# Patient Record
Sex: FEMALE | Race: WHITE | NOT HISPANIC OR LATINO
[De-identification: names, ages, dates, MRNs, and addresses within clinical notes are randomized per-mention and may not be internally consistent; named-entity substitution may affect disease eponyms.]

---

## 2022-06-02 ENCOUNTER — APPOINTMENT (OUTPATIENT)
Dept: ORTHOPEDIC SURGERY | Facility: CLINIC | Age: 82
End: 2022-06-02
Payer: MEDICARE

## 2022-06-02 VITALS — WEIGHT: 175 LBS | BODY MASS INDEX: 32.2 KG/M2 | HEIGHT: 62 IN

## 2022-06-02 PROBLEM — Z00.00 ENCOUNTER FOR PREVENTIVE HEALTH EXAMINATION: Status: ACTIVE | Noted: 2022-06-02

## 2022-06-02 PROCEDURE — 20610 DRAIN/INJ JOINT/BURSA W/O US: CPT

## 2022-06-02 PROCEDURE — 99213 OFFICE O/P EST LOW 20 MIN: CPT | Mod: 25

## 2022-06-02 PROCEDURE — J3490M: CUSTOM

## 2022-06-02 NOTE — HISTORY OF PRESENT ILLNESS
[0] : 0 [Sleep] : sleep [de-identified] : 6-2-22- Known left knee oa did well with orthovisc and therapy in november. Over the last 2 weeks medially based pain has returned and [place] [] : no [FreeTextEntry1] : left knee  [FreeTextEntry5] : She states that she felt great after her gel injs but she had a flare up on 5/27/22 and feels that the pain has been getting worse since. She couldn’t walk and had to use a wheelchair because the pain was so bad.

## 2022-06-02 NOTE — PHYSICAL EXAM
[Left] : left knee [5___] : hamstring 5[unfilled]/5 [Equivocal] : equivocal Rufus [] : negative Lachmann [TWNoteComboBox7] : flexion 120 degrees [de-identified] : extension 0 degrees

## 2022-06-02 NOTE — PROCEDURE
[Large Joint Injection] : Large joint injection [Left] : of the left [Knee] : knee [Pain] : pain [Inflammation] : inflammation [X-ray evidence of Osteoarthritis on this or prior visit] : x-ray evidence of Osteoarthritis on this or prior visit [Alcohol] : alcohol [Betadine] : betadine [Ethyl Chloride sprayed topically] : ethyl chloride sprayed topically [Sterile technique used] : sterile technique used [___ cc    6mg] :  Betamethasone (Celestone) ~Vcc of 6mg [___ cc    1%] : Lidocaine ~Vcc of 1%  [] : Patient tolerated procedure well [Call if redness, pain or fever occur] : call if redness, pain or fever occur [Apply ice for 15min out of every hour for the next 12-24 hours as tolerated] : apply ice for 15 minutes out of every hour for the next 12-24 hours as tolerated [Patient was advised to rest the joint(s) for ____ days] : patient was advised to rest the joint(s) for [unfilled] days [Previous OTC use and PT nontherapeutic] : patient has tried OTC's including aspirin, Ibuprofen, Aleve, etc or prescription NSAIDS, and/or exercises at home and/or physical therapy without satisfactory response [Patient had decreased mobility in the joint] : patient had decreased mobility in the joint [Risks, benefits, alternatives discussed / Verbal consent obtained] : the risks benefits, and alternatives have been discussed, and verbal consent was obtained

## 2022-06-02 NOTE — ASSESSMENT
[FreeTextEntry1] : will administer csi today as she has a trip tomorrow f/u 2 weeks for start of orthovisc series

## 2022-06-21 ENCOUNTER — APPOINTMENT (OUTPATIENT)
Dept: ORTHOPEDIC SURGERY | Facility: CLINIC | Age: 82
End: 2022-06-21
Payer: MEDICARE

## 2022-06-21 VITALS — BODY MASS INDEX: 32.2 KG/M2 | HEIGHT: 62 IN | WEIGHT: 175 LBS

## 2022-06-21 PROCEDURE — 20610 DRAIN/INJ JOINT/BURSA W/O US: CPT

## 2022-06-21 PROCEDURE — 99213 OFFICE O/P EST LOW 20 MIN: CPT | Mod: 25

## 2022-06-21 NOTE — PHYSICAL EXAM
[Left] : left knee [5___] : hamstring 5[unfilled]/5 [Equivocal] : equivocal Rufus [] : negative Lachmann [TWNoteComboBox7] : flexion 120 degrees [de-identified] : extension 0 degrees

## 2022-06-21 NOTE — PROCEDURE
[Large Joint Injection] : Large joint injection [Left] : of the left [Knee] : knee [Pain] : pain [Inflammation] : inflammation [X-ray evidence of Osteoarthritis on this or prior visit] : x-ray evidence of Osteoarthritis on this or prior visit [Alcohol] : alcohol [Betadine] : betadine [Ethyl Chloride sprayed topically] : ethyl chloride sprayed topically [Sterile technique used] : sterile technique used [Orthovisc] : Orthovisc [#1] : series #1 [] : Patient tolerated procedure well [Call if redness, pain or fever occur] : call if redness, pain or fever occur [Apply ice for 15min out of every hour for the next 12-24 hours as tolerated] : apply ice for 15 minutes out of every hour for the next 12-24 hours as tolerated [Patient was advised to rest the joint(s) for ____ days] : patient was advised to rest the joint(s) for [unfilled] days [Previous OTC use and PT nontherapeutic] : patient has tried OTC's including aspirin, Ibuprofen, Aleve, etc or prescription NSAIDS, and/or exercises at home and/or physical therapy without satisfactory response [Patient had decreased mobility in the joint] : patient had decreased mobility in the joint [Risks, benefits, alternatives discussed / Verbal consent obtained] : the risks benefits, and alternatives have been discussed, and verbal consent was obtained

## 2022-06-21 NOTE — HISTORY OF PRESENT ILLNESS
[0] : 0 [Sleep] : sleep [de-identified] : 6-2-22- Known left knee oa did well with orthovisc and therapy in november. Over the last 2 weeks medially based pain has returned and [place] [] : no [FreeTextEntry1] : left knee  [FreeTextEntry5] : She states that she felt great after her gel injs but she had a flare up on 5/27/22 and feels that the pain has been getting worse since. She couldn’t walk and had to use a wheelchair because the pain was so bad.

## 2022-06-23 ENCOUNTER — RESULT REVIEW (OUTPATIENT)
Age: 82
End: 2022-06-23

## 2022-06-28 ENCOUNTER — APPOINTMENT (OUTPATIENT)
Dept: ORTHOPEDIC SURGERY | Facility: CLINIC | Age: 82
End: 2022-06-28

## 2022-06-28 PROCEDURE — 99213 OFFICE O/P EST LOW 20 MIN: CPT | Mod: 25

## 2022-06-28 PROCEDURE — 20610 DRAIN/INJ JOINT/BURSA W/O US: CPT

## 2022-06-28 NOTE — HISTORY OF PRESENT ILLNESS
[0] : 0 [Sleep] : sleep [de-identified] : 6-2-22- Known left knee oa did well with orthovisc and therapy in november. Over the last 2 weeks medially based pain has returned and [place] [] : no [FreeTextEntry1] : left knee  [FreeTextEntry5] : She states that she felt great after her gel injs but she had a flare up on 5/27/22 and feels that the pain has been getting worse since. She couldn’t walk and had to use a wheelchair because the pain was so bad.

## 2022-06-28 NOTE — PROCEDURE
[Large Joint Injection] : Large joint injection [Left] : of the left [Knee] : knee [Pain] : pain [Inflammation] : inflammation [X-ray evidence of Osteoarthritis on this or prior visit] : x-ray evidence of Osteoarthritis on this or prior visit [Alcohol] : alcohol [Betadine] : betadine [Ethyl Chloride sprayed topically] : ethyl chloride sprayed topically [Sterile technique used] : sterile technique used [Orthovisc] : Orthovisc [#2] : series #2 [] : Patient tolerated procedure well [Call if redness, pain or fever occur] : call if redness, pain or fever occur [Apply ice for 15min out of every hour for the next 12-24 hours as tolerated] : apply ice for 15 minutes out of every hour for the next 12-24 hours as tolerated [Patient was advised to rest the joint(s) for ____ days] : patient was advised to rest the joint(s) for [unfilled] days [Previous OTC use and PT nontherapeutic] : patient has tried OTC's including aspirin, Ibuprofen, Aleve, etc or prescription NSAIDS, and/or exercises at home and/or physical therapy without satisfactory response [Patient had decreased mobility in the joint] : patient had decreased mobility in the joint [Risks, benefits, alternatives discussed / Verbal consent obtained] : the risks benefits, and alternatives have been discussed, and verbal consent was obtained

## 2022-06-28 NOTE — PHYSICAL EXAM
[Left] : left knee [5___] : hamstring 5[unfilled]/5 [Equivocal] : equivocal Rufus [] : negative Lachmann [TWNoteComboBox7] : flexion 120 degrees [de-identified] : extension 0 degrees

## 2022-07-05 ENCOUNTER — APPOINTMENT (OUTPATIENT)
Dept: ORTHOPEDIC SURGERY | Facility: CLINIC | Age: 82
End: 2022-07-05

## 2022-07-05 PROCEDURE — 20610 DRAIN/INJ JOINT/BURSA W/O US: CPT | Mod: EJ

## 2022-07-05 NOTE — HISTORY OF PRESENT ILLNESS
[0] : 0 [Sleep] : sleep [de-identified] : 7-5-22- for continued orthovisc to the left knee #3\par \par 6-2-22- Known left knee oa did well with orthovisc and therapy in november. Over the last 2 weeks medially based pain has returned and [place] [] : no [FreeTextEntry1] : left knee  [FreeTextEntry5] : She states that she felt great after her gel injs but she had a flare up on 5/27/22 and feels that the pain has been getting worse since. She couldn’t walk and had to use a wheelchair because the pain was so bad.

## 2022-07-05 NOTE — PROCEDURE
[Large Joint Injection] : Large joint injection [Left] : of the left [Knee] : knee [Pain] : pain [Inflammation] : inflammation [X-ray evidence of Osteoarthritis on this or prior visit] : x-ray evidence of Osteoarthritis on this or prior visit [Alcohol] : alcohol [Betadine] : betadine [Ethyl Chloride sprayed topically] : ethyl chloride sprayed topically [Sterile technique used] : sterile technique used [Orthovisc] : Orthovisc [#3] : series #3 [] : Patient tolerated procedure well [Call if redness, pain or fever occur] : call if redness, pain or fever occur [Apply ice for 15min out of every hour for the next 12-24 hours as tolerated] : apply ice for 15 minutes out of every hour for the next 12-24 hours as tolerated [Patient was advised to rest the joint(s) for ____ days] : patient was advised to rest the joint(s) for [unfilled] days [Previous OTC use and PT nontherapeutic] : patient has tried OTC's including aspirin, Ibuprofen, Aleve, etc or prescription NSAIDS, and/or exercises at home and/or physical therapy without satisfactory response [Patient had decreased mobility in the joint] : patient had decreased mobility in the joint [Risks, benefits, alternatives discussed / Verbal consent obtained] : the risks benefits, and alternatives have been discussed, and verbal consent was obtained

## 2022-07-12 ENCOUNTER — APPOINTMENT (OUTPATIENT)
Dept: ORTHOPEDIC SURGERY | Facility: CLINIC | Age: 82
End: 2022-07-12

## 2022-07-12 VITALS — HEIGHT: 62 IN | WEIGHT: 175 LBS | BODY MASS INDEX: 32.2 KG/M2

## 2022-07-12 PROCEDURE — 99213 OFFICE O/P EST LOW 20 MIN: CPT | Mod: 25

## 2022-07-12 PROCEDURE — 20610 DRAIN/INJ JOINT/BURSA W/O US: CPT

## 2022-07-12 NOTE — HISTORY OF PRESENT ILLNESS
[4] : 4 [Orthovisc] : Orthovisc [0] : 0 [Sleep] : sleep [de-identified] : 7-5-22- for continued orthovisc to the left knee #3\par \par 6-2-22- Known left knee oa did well with orthovisc and therapy in november. Over the last 2 weeks medially based pain has returned and [place] [] : no [FreeTextEntry1] : left knee  [FreeTextEntry5] : She states that she felt great after her gel injs but she had a flare up on 5/27/22 and feels that the pain has been getting worse since. She couldn’t walk and had to use a wheelchair because the pain was so bad. [de-identified] : 7/5/22

## 2022-07-12 NOTE — PHYSICAL EXAM
[Left] : left knee [5___] : hamstring 5[unfilled]/5 [Equivocal] : equivocal Rufus [] : negative Lachmann [TWNoteComboBox7] : flexion 120 degrees [de-identified] : extension 0 degrees

## 2022-07-12 NOTE — PROCEDURE
[Large Joint Injection] : Large joint injection [Left] : of the left [Knee] : knee [Pain] : pain [Inflammation] : inflammation [X-ray evidence of Osteoarthritis on this or prior visit] : x-ray evidence of Osteoarthritis on this or prior visit [Alcohol] : alcohol [Betadine] : betadine [Ethyl Chloride sprayed topically] : ethyl chloride sprayed topically [Sterile technique used] : sterile technique used [Orthovisc] : Orthovisc [] : Patient tolerated procedure well [Call if redness, pain or fever occur] : call if redness, pain or fever occur [Apply ice for 15min out of every hour for the next 12-24 hours as tolerated] : apply ice for 15 minutes out of every hour for the next 12-24 hours as tolerated [Patient was advised to rest the joint(s) for ____ days] : patient was advised to rest the joint(s) for [unfilled] days [Previous OTC use and PT nontherapeutic] : patient has tried OTC's including aspirin, Ibuprofen, Aleve, etc or prescription NSAIDS, and/or exercises at home and/or physical therapy without satisfactory response [Patient had decreased mobility in the joint] : patient had decreased mobility in the joint [Risks, benefits, alternatives discussed / Verbal consent obtained] : the risks benefits, and alternatives have been discussed, and verbal consent was obtained [#4] : series #4

## 2022-12-30 NOTE — PHYSICAL EXAM
[x] [facility]) [facility] &  Therapy  [address]    [phone]  F: [phone]   [] [id number]  [facility] [address]   [address]  P: [phone]  F: [phone]  [] [address]  [address]  P: [phone]  F: [phone] [] [address]  P: [phone]  F: [phone]  [] [address] [facility]   [address]   [place]: [phone]  F: [phone]   [] [place] [id number]  [place]: [phone]   F: [phone]     Physical Therapy Cancel/No Show note    Date: 2022  Patient: Radha Miller  : 1961  MRN: [id number]    Cancels/No Shows to date:     For today's appointment patient:    [x]  Cancelled    [] Rescheduled appointment    [] No-show     Reason given by patient:    [x]  Patient ill    []  Conflicting appointment    [] No transportation      [] Conflict with work    [] No reason given    [] Weather related    [] [id number]    [] Other:      Comments:      [x] Next appointment was previously confirmed    Electronically signed by: Tanesha Murguia PTA [Left] : left knee [5___] : hamstring 5[unfilled]/5 [Equivocal] : equivocal Rufus [] : negative Lachmann [TWNoteComboBox7] : flexion 120 degrees [de-identified] : extension 0 degrees

## 2023-01-26 ENCOUNTER — APPOINTMENT (OUTPATIENT)
Dept: ORTHOPEDIC SURGERY | Facility: CLINIC | Age: 83
End: 2023-01-26
Payer: MEDICARE

## 2023-01-26 VITALS — HEIGHT: 62 IN | WEIGHT: 175 LBS | BODY MASS INDEX: 32.2 KG/M2

## 2023-01-26 PROCEDURE — 72040 X-RAY EXAM NECK SPINE 2-3 VW: CPT

## 2023-01-26 PROCEDURE — 72100 X-RAY EXAM L-S SPINE 2/3 VWS: CPT

## 2023-01-26 PROCEDURE — 72170 X-RAY EXAM OF PELVIS: CPT

## 2023-01-26 PROCEDURE — 99214 OFFICE O/P EST MOD 30 MIN: CPT

## 2023-01-26 RX ORDER — METHYLPREDNISOLONE 4 MG/1
4 TABLET ORAL
Qty: 1 | Refills: 0 | Status: ACTIVE | COMMUNITY
Start: 2023-01-26 | End: 1900-01-01

## 2023-01-26 NOTE — PHYSICAL EXAM
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 No significant abnormalities are seen] : There are no fractures, subluxations or dislocations. No significant abnormalities are seen [] : negative Lachmann [TWNoteComboBox7] : flexion 120 degrees [de-identified] : extension 0 degrees

## 2023-01-26 NOTE — HISTORY OF PRESENT ILLNESS
[0] : 0 [Sleep] : sleep [4] : 4 [Orthovisc] : Orthovisc [de-identified] : 7-5-22- for continued orthovisc to the left knee #3\par \par 6-2-22- Known left knee oa did well with orthovisc and therapy in november. Over the last 2 weeks medially based pain has returned and [place] [] : no [FreeTextEntry1] : left knee  [FreeTextEntry5] : She states that she felt great after her gel injs but she had a flare up on 5/27/22 and feels that the pain has been getting worse since. She couldn’t walk and had to use a wheelchair because the pain was so bad. [de-identified] : 7/5/22

## 2023-01-26 NOTE — REASON FOR VISIT
[FreeTextEntry2] : 1/26/23- Left knee is feeling better, having low back pain issues, and pain down right arm to hand tre at night. No neck or back injury

## 2023-03-30 ENCOUNTER — APPOINTMENT (OUTPATIENT)
Dept: ORTHOPEDIC SURGERY | Facility: CLINIC | Age: 83
End: 2023-03-30
Payer: MEDICARE

## 2023-03-30 VITALS — HEIGHT: 62 IN | WEIGHT: 175 LBS | BODY MASS INDEX: 32.2 KG/M2

## 2023-03-30 DIAGNOSIS — M50.320 OTHER CERVICAL DISC DEGENERATION, MID-CERVICAL REGION, UNSPECIFIED LEVEL: ICD-10-CM

## 2023-03-30 DIAGNOSIS — M17.12 UNILATERAL PRIMARY OSTEOARTHRITIS, LEFT KNEE: ICD-10-CM

## 2023-03-30 PROCEDURE — 99213 OFFICE O/P EST LOW 20 MIN: CPT

## 2023-03-30 NOTE — REASON FOR VISIT
[FreeTextEntry2] : 3/30/23- Still getting right arm symptoms at night, but pain/numbness subsides during the day. PT of help\par 1/26/23- Left knee is feeling better, having low back pain issues, and pain down right arm to hand tre at night. No neck or back injury

## 2023-03-30 NOTE — HISTORY OF PRESENT ILLNESS
[0] : 0 [Sleep] : sleep [4] : 4 [Orthovisc] : Orthovisc [de-identified] : 7-5-22- for continued orthovisc to the left knee #3\par \par 6-2-22- Known left knee oa did well with orthovisc and therapy in november. Over the last 2 weeks medially based pain has returned and [place] [] : no [FreeTextEntry1] : left knee  [FreeTextEntry5] : She states that she felt great after her gel injs but she had a flare up on 5/27/22 and feels that the pain has been getting worse since. She couldn’t walk and had to use a wheelchair because the pain was so bad. [de-identified] : 7/5/22

## 2023-03-30 NOTE — PHYSICAL EXAM
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 No significant abnormalities are seen] : There are no fractures, subluxations or dislocations. No significant abnormalities are seen [Left] : left knee [5___] : hamstring 5[unfilled]/5 [Negative] : negative Justyna's [] : patient ambulates without assistive device [TWNoteComboBox7] : flexion 120 degrees [de-identified] : extension 0 degrees

## 2023-04-25 ENCOUNTER — FORM ENCOUNTER (OUTPATIENT)
Age: 83
End: 2023-04-25

## 2023-04-25 ENCOUNTER — APPOINTMENT (OUTPATIENT)
Dept: ORTHOPEDIC SURGERY | Facility: CLINIC | Age: 83
End: 2023-04-25
Payer: MEDICARE

## 2023-04-25 VITALS — HEIGHT: 62 IN | BODY MASS INDEX: 32.2 KG/M2 | WEIGHT: 175 LBS

## 2023-04-25 DIAGNOSIS — M51.36 OTHER INTERVERTEBRAL DISC DEGENERATION, LUMBAR REGION: ICD-10-CM

## 2023-04-25 DIAGNOSIS — M41.80 OTHER FORMS OF SCOLIOSIS, SITE UNSPECIFIED: ICD-10-CM

## 2023-04-25 PROCEDURE — 99213 OFFICE O/P EST LOW 20 MIN: CPT

## 2023-04-25 NOTE — HISTORY OF PRESENT ILLNESS
[0] : 0 [Sleep] : sleep [4] : 4 [Orthovisc] : Orthovisc [de-identified] : 7-5-22- for continued orthovisc to the left knee #3\par \par 6-2-22- Known left knee oa did well with orthovisc and therapy in november. Over the last 2 weeks medially based pain has returned and [place] [] : no [FreeTextEntry1] : left knee  [FreeTextEntry5] : She states that she felt great after her gel injs but she had a flare up on 5/27/22 and feels that the pain has been getting worse since. She couldn’t walk and had to use a wheelchair because the pain was so bad. [de-identified] : 7/5/22

## 2023-04-25 NOTE — DISCUSSION/SUMMARY
[de-identified] : Needs MRI lumbar to assess HNP/stenosis, then referred to Pain Management for LESIs

## 2023-04-25 NOTE — PHYSICAL EXAM
[Left] : left knee [5___] : hamstring 5[unfilled]/5 [Negative] : negative Justyna's [] : patient ambulates without assistive device [TWNoteComboBox7] : flexion 120 degrees [de-identified] : extension 0 degrees

## 2023-04-25 NOTE — REASON FOR VISIT
[FreeTextEntry2] : 4/25/23- Having worsening left sciatica symptoms from buttock down leg\par 3/30/23- Still getting right arm symptoms at night, but pain/numbness subsides during the day. PT of help\par 1/26/23- Left knee is feeling better, having low back pain issues, and pain down right arm to hand tre at night. No neck or back injury

## 2023-04-26 ENCOUNTER — APPOINTMENT (OUTPATIENT)
Dept: MRI IMAGING | Facility: CLINIC | Age: 83
End: 2023-04-26
Payer: MEDICARE

## 2023-04-26 PROCEDURE — 72148 MRI LUMBAR SPINE W/O DYE: CPT

## 2023-05-09 ENCOUNTER — APPOINTMENT (OUTPATIENT)
Dept: PAIN MANAGEMENT | Facility: CLINIC | Age: 83
End: 2023-05-09
Payer: MEDICARE

## 2023-05-09 VITALS — HEIGHT: 62 IN | BODY MASS INDEX: 32.2 KG/M2 | WEIGHT: 175 LBS

## 2023-05-09 DIAGNOSIS — Z78.9 OTHER SPECIFIED HEALTH STATUS: ICD-10-CM

## 2023-05-09 DIAGNOSIS — I47.1 SUPRAVENTRICULAR TACHYCARDIA: ICD-10-CM

## 2023-05-09 PROCEDURE — 99204 OFFICE O/P NEW MOD 45 MIN: CPT

## 2023-05-09 NOTE — PHYSICAL EXAM
[NL (90)] : forward flexion 90 degrees [Extension] : extension [4___] : left hip flexion 4[unfilled]/5 [3___] : right hip flexion 3[unfilled]/5 [] : positive Morillo maneuver facet loading [de-identified] : extension 10 degrees

## 2023-05-09 NOTE — ASSESSMENT
[FreeTextEntry1] : After discussing various treatment options with the patient including but not limited to oral medications, physical therapy, exercise, modalities as well as interventional spinal injections, we have decided with the following plan:\par \par 1) Intervention Injection Therapy:\par I personally reviewed the MRI/CT scan images and agree with the radiologist's report. The radiological findings were discussed with the patient.\par The risks, benefits, contents and alternatives to injection were explained in full to the patient. Risks outlined include but are not limited to infection,sepsis, bleeding, post-dural puncture headache, nerve damage, temporary increase in pain, syncopal episode, failure to resolve symptoms, allergic reaction, symptom recurrence, and elevation of blood sugar in diabetics. Cortisone may cause immunosuppression. Patient understands the risks. All questions were answered. After discussion of options, patient requested an injection. Information regarding the injection was given to the patient. Which medications to stop prior to the injection was explained to the patient as well.\par \par Follow up in 1-2 weeks post injection for re-evaluation. \par Continue Home exercises, stretching, activity modification, physical therapy, and conservative care.\par \par Patient is presenting with acute/sub-acute radicular pain with impairment in ADLs and functionality.  The pain has not responded sufficiently to  conservative care including nsaid therapy and/or physical therapy.  There is no bleeding tendency, unstable medical condition, or systemic infection. The purpose of the spinal injections is to facilitate active therapy by providing short term relief through reduction of pain and inflammation. \par \par Injections, by themselves, are not likely to provide long-term relief. Rather, active rehabilitation with modified work achieves long-term relief by increasing active ROM, strength and stability. \par \par LESI L4/5 \par \par 2) I would recommend acupuncture as an adjuvant therapy for JOSE . He has completed other conservative therapies including med management and physical therapy. Goals would be to improve pain, spasm, ROM and overall function. \par \par \par \par \par \par

## 2023-05-09 NOTE — HISTORY OF PRESENT ILLNESS
[Lower back] : lower back [4] : 4 [Radiating] : radiating [Constant] : constant [Household chores] : household chores [Meds] : meds [Physical therapy] : physical therapy [Sitting] : sitting [de-identified] : 05/09/2023 : Patient presents for initial evaluation. She c/o back pain and left leg pain for the last couple of months. Pain radiates to the ankle. +n/t in the feet. no weakness. Takes Advil PRN.  Currently in PT with some benefit. Also treating her neck pain. \par \par Subjective Weakness: No\par Numbness/Tingling: Yes\par Bladder/Bowel dysfunction: No\par Physical Therapy: Current\par \par \par Attempted modalities for current pain complaint:\par See above:\par Medications: No\par \par Injections: No \par \par Previous Spine Surgery: N/A\par \par Imaging:\par MRI Lumbar Spine (4/26/23) Impression: \par 1. Convex left curvature, diffuse loss of disc signal and height, Schmorl's nodes and Modic type 1 endplate \par change at anterior L1-L2. No fracture.\par 2. L5-S1: Bulge, facet hypertrophy, and left facet effusion with inferior left foraminal stenosis.\par 3. L4-L5: Minor retrolisthesis. Bulge, spondylotic ridge, facet arthrosis, ligamentum flavum hypertrophy, and \par facet effusion on the right. Foraminal stenosis on the left. Mild central stenosis.\par 4. L3-L4: Minor retrolisthesis. Broad bulge, facet arthrosis, ligamentum flavum hypertrophy with foraminal \par stenosis. Mild central stenosis.\par  5. L2-L3: Minor retrolisthesis. Broad bulge, spondylotic ridge, facet arthrosis right greater than left. Mild \par central stenosis.\par 6. L1-L2: Minor retrolisthesis. Broad bulge, facet arthrosis, ligamentum flavum hypertrophy with facet effusion \par right greater than left. Inferior right foraminal stenosis. Mild central stenosis.\par 7. T12-L1: Broad bulge and facet arthrosis.\par 8. Bilateral renal cysts. Dilated ureters. Ultrasound is suggested for further evaluation. [] : no [FreeTextEntry7] : left side  [FreeTextEntry9] : XANAX  [de-identified] : getting up from sitting  [de-identified] : MRI L SPINE 04/23 [facility]

## 2023-05-25 ENCOUNTER — APPOINTMENT (OUTPATIENT)
Dept: PAIN MANAGEMENT | Facility: CLINIC | Age: 83
End: 2023-05-25
Payer: MEDICARE

## 2023-05-25 PROCEDURE — 62323 NJX INTERLAMINAR LMBR/SAC: CPT

## 2023-05-25 NOTE — PROCEDURE
[FreeTextEntry3] : Date of Service: 05/25/2023 \par \par Account: [id number]\par \par Patient: JOSE TATE \par \par YOB: 1940\par \par Age: 83 year\par \par Surgeon: Flor Rodrigues M.D.\par \par Pre-Operative Diagnosis:  Lumbosacral Radiculitis (M54.17)\par \par Post Operative Diagnosis: Lumbosacral Radiculitis (M54.17)\par \par Procedure: Interlaminar lumbar epidural steroid injection (L4-5) under fluoroscopic guidance\par \par Anesthesia:           MAC\par \par \par This procedure was carried out using fluoroscopic guidance.  The risks and benefits of the procedure were discussed extensively with the patient.  The consent of the patient was obtained and the following procedure was performed.\par \par The patient was placed in the prone position.  The lumbar area was prepped and draped in a sterile fashion.  Under AP view with slight cephalad-caudad angulation, the L4-5 interspace was identified and marked.  Using sterile technique the superficial skin was anesthetized with 1% Lidocaine without epinephrine.  A 20 gauge Tuohoy needle was advanced into the epidural space under fluoroscopy using [id number] technique and using loss of resistance at the L4-5 level.  After negative aspiration for heme or CSF, an epidurogram was obtained using 3 cc Omnipaque contrast confirming epidural placement of the needle. \par \par Lumbar epidurogram showed no intrathecal or intravascular spread and showed adequate bilateral epidural spread from L1 to S1 levels.\par \par After this, 5 cc of preservative free normal saline and 80 mg of Kenalog were injected into the epidural space.\par \par The needle was subsequently removed.  Anesthesia personnel were present throughout the procedure.\par \par The patient tolerated the procedure well and was instructed to contact me immediately if there were any problems.\par \par Flor Rodrigues M.D.\par

## 2023-06-14 ENCOUNTER — APPOINTMENT (OUTPATIENT)
Dept: PAIN MANAGEMENT | Facility: CLINIC | Age: 83
End: 2023-06-14

## 2023-06-15 ENCOUNTER — APPOINTMENT (OUTPATIENT)
Dept: PAIN MANAGEMENT | Facility: CLINIC | Age: 83
End: 2023-06-15
Payer: MEDICARE

## 2023-06-15 VITALS — HEIGHT: 62 IN | WEIGHT: 175 LBS | BODY MASS INDEX: 32.2 KG/M2

## 2023-06-15 DIAGNOSIS — M54.50 LOW BACK PAIN, UNSPECIFIED: ICD-10-CM

## 2023-06-15 PROCEDURE — 99214 OFFICE O/P EST MOD 30 MIN: CPT

## 2023-06-15 NOTE — PHYSICAL EXAM
[NL (90)] : forward flexion 90 degrees [Extension] : extension [4___] : left hip flexion 4[unfilled]/5 [3___] : right hip flexion 3[unfilled]/5 [] : no lumbar paraspinal tenderness [de-identified] : extension 10 degrees

## 2023-06-15 NOTE — ASSESSMENT
[FreeTextEntry1] : After discussing various treatment options with the patient including but not limited to oral medications, physical therapy, exercise, modalities as well as interventional spinal injections, we have decided with the following plan:\par \par 1) Intervention Injection Therapy:\par I personally reviewed the MRI/CT scan images and agree with the radiologist's report. The radiological findings were discussed with the patient.\par The risks, benefits, contents and alternatives to injection were explained in full to the patient. Risks outlined include but are not limited to infection,sepsis, bleeding, post-dural puncture headache, nerve damage, temporary increase in pain, syncopal episode, failure to resolve symptoms, allergic reaction, symptom recurrence, and elevation of blood sugar in diabetics. Cortisone may cause immunosuppression. Patient understands the risks. All questions were answered. After discussion of options, patient requested an injection. Information regarding the injection was given to the patient. Which medications to stop prior to the injection was explained to the patient as well.\par \par Follow up in 1-2 weeks post injection for re-evaluation. \par Continue Home exercises, stretching, activity modification, physical therapy, and conservative care.\par \par Patient is presenting with acute/sub-acute radicular pain with impairment in ADLs and functionality.  The pain has not responded sufficiently to  conservative care including nsaid therapy and/or physical therapy.  There is no bleeding tendency, unstable medical condition, or systemic infection. The purpose of the spinal injections is to facilitate active therapy by providing short term relief through reduction of pain and inflammation. \par \par Injections, by themselves, are not likely to provide long-term relief. Rather, active rehabilitation with modified work achieves long-term relief by increasing active ROM, strength and stability. \par \par TFESI L4/5 , 5/S1\par \par 2) I would recommend acupuncture as an adjuvant therapy for JOSE . He has completed other conservative therapies including med management and physical therapy. Goals would be to improve pain, spasm, ROM and overall function. \par \par \par \par \par \par

## 2023-06-15 NOTE — HISTORY OF PRESENT ILLNESS
[Lower back] : lower back [Radiating] : radiating [Household chores] : household chores [Meds] : meds [Physical therapy] : physical therapy [Sitting] : sitting [Neck] : neck [4] : 4 [Dull/Aching] : dull/aching [Intermittent] : intermittent [] : no [FreeTextEntry7] : left side  [FreeTextEntry9] : XANAX  [de-identified] : getting up from sitting  [de-identified] : MRI L SPINE 04/23 [facility] P[facility]  [de-identified] : 06/15/2023: follow up today sfter DIONY L4-5 on 5/25.  Had 80% relief for about 2 weeks.  Will switch to Left L4/5, L5/S1 TFESI.  \par \par 05/09/2023 : Patient presents for initial evaluation. She c/o back pain and left leg pain for the last couple of months. Pain radiates to the ankle. +n/t in the feet. no weakness. Takes Advil PRN.  Currently in PT with some benefit. Also treating her neck pain. \par \par Subjective Weakness: No\par Numbness/Tingling: Yes\par Bladder/Bowel dysfunction: No\par Physical Therapy: Current\par \par \par Attempted modalities for current pain complaint:\par See above:\par Medications: No\par \par Injections: No \par DIONY L4/5 5/25/23\par Previous Spine Surgery: N/A\par \par Imaging:\par MRI Lumbar Spine (4/26/23) Impression: \par 1. Convex left curvature, diffuse loss of disc signal and height, Schmorl's nodes and Modic type 1 endplate \par change at anterior L1-L2. No fracture.\par 2. L5-S1: Bulge, facet hypertrophy, and left facet effusion with inferior left foraminal stenosis.\par 3. L4-L5: Minor retrolisthesis. Bulge, spondylotic ridge, facet arthrosis, ligamentum flavum hypertrophy, and \par facet effusion on the right. Foraminal stenosis on the left. Mild central stenosis.\par 4. L3-L4: Minor retrolisthesis. Broad bulge, facet arthrosis, ligamentum flavum hypertrophy with foraminal \par stenosis. Mild central stenosis.\par  5. L2-L3: Minor retrolisthesis. Broad bulge, spondylotic ridge, facet arthrosis right greater than left. Mild \par central stenosis.\par 6. L1-L2: Minor retrolisthesis. Broad bulge, facet arthrosis, ligamentum flavum hypertrophy with facet effusion \par right greater than left. Inferior right foraminal stenosis. Mild central stenosis.\par 7. T12-L1: Broad bulge and facet arthrosis.\par 8. Bilateral renal cysts. Dilated ureters. Ultrasound is suggested for further evaluation.

## 2023-07-13 ENCOUNTER — APPOINTMENT (OUTPATIENT)
Dept: PAIN MANAGEMENT | Facility: CLINIC | Age: 83
End: 2023-07-13

## 2023-08-10 ENCOUNTER — APPOINTMENT (OUTPATIENT)
Dept: PAIN MANAGEMENT | Facility: CLINIC | Age: 83
End: 2023-08-10
Payer: MEDICARE

## 2023-08-10 PROCEDURE — 64483 NJX AA&/STRD TFRM EPI L/S 1: CPT | Mod: LT

## 2023-08-10 PROCEDURE — 64484 NJX AA&/STRD TFRM EPI L/S EA: CPT | Mod: LT

## 2023-08-10 NOTE — PROCEDURE
[FreeTextEntry3] : Date of Service: 08/10/2023   Account: [id number]  Patient: JOSE TATE   YOB: 1940  Age: 83 year   Surgeon: Flor Rodrigues M.D.  Assistant: None.  Pre-Operative Diagnosis: Lumbosacral Radiculitis (M54.17)  Post Operative Diagnosis: Lumbosacral Radiculitis (M54.17)  Procedure: Left L4-5, L5-S1 transforaminal epidural steroid injection under fluoroscopic guidance.           This procedure was carried out using fluoroscopic guidance.  The risks and benefits of the procedure were discussed extensively with the patient.  The consent of the patient was obtained and the following procedure was performed. The patient was placed in the prone position on the fluoroscopic table and the lumbar area was prepped and draped in a sterile fashion.  The left L4-5 and L5-S1 neural foramen were identified on left oblique "nile dog" anatomical view at the 6 o' clock position using fluoroscopic guidance, and the area was marked. The overlying skin and subcutaneous structures were anesthetized using sterile technique with 1% Lidocaine.  A 22 gauge spinal needle was directed toward the inferior (6 o'clock) position of the pedicle, which formed the roof of the identified foramens.  Once in the epidural space, after negative aspiration for heme and CSF, 1cc of Omnipaque contrast was injected to confirm epidural location and assess filling defects and rule out intravascular needle placement.   The following contrast flow and epidurogram was observed: no intravascular or intrathecal flow pattern was noted.  No blood or CSF was aspirated. Omnipaque spread appeared to outline the left L4 and L5 nerve roots and spread medially into the epidural space.    After this, an injectate of 3 cc preservative free normal saline plus 40 mg of Kenalog was injected in the epidural space at each of the two levels.  The needle was subsequently removed.   The patient tolerated the procedure well.  There were no complications.  The patient was instructed to apply ice over the injection sites for twenty minutes every two hours for the next 24 to 48 hours.  The patient was also instructed to contact me immediately if there were any problems.  Flor Rodrigues M.D.

## 2023-08-24 ENCOUNTER — APPOINTMENT (OUTPATIENT)
Dept: PAIN MANAGEMENT | Facility: CLINIC | Age: 83
End: 2023-08-24
Payer: MEDICARE

## 2023-08-24 VITALS — BODY MASS INDEX: 32.2 KG/M2 | HEIGHT: 62 IN | WEIGHT: 175 LBS

## 2023-08-24 DIAGNOSIS — M47.816 SPONDYLOSIS W/OUT MYELOPATHY OR RADICULOPATHY, LUMBAR REGION: ICD-10-CM

## 2023-08-24 PROCEDURE — 99214 OFFICE O/P EST MOD 30 MIN: CPT

## 2023-08-24 NOTE — ADDENDUM
[FreeTextEntry1] : Patient with recent blood work with WBC 2.0 and Platelets 140. she has f/u with heme/onc. She will f/u after. injection cancelled.

## 2023-08-24 NOTE — PHYSICAL EXAM
[NL (90)] : forward flexion 90 degrees [Extension] : extension [4___] : left hip flexion 4[unfilled]/5 [3___] : right hip flexion 3[unfilled]/5 [] : motor exam is non-focal throughout both lower extremities [de-identified] : extension 10 degrees

## 2023-08-24 NOTE — HISTORY OF PRESENT ILLNESS
[Neck] : neck [Lower back] : lower back [4] : 4 [Dull/Aching] : dull/aching [Radiating] : radiating [Intermittent] : intermittent [Household chores] : household chores [Meds] : meds [Physical therapy] : physical therapy [Sitting] : sitting [FreeTextEntry7] : left side  [7] : 7 [6] : 6 [Injection therapy] : injection therapy [Lying in bed] : lying in bed [] : no [FreeTextEntry1] : Right side  [FreeTextEntry9] : XANAX  [de-identified] : getting up from sitting  [de-identified] : MRI L SPINE 04/23 [facility] P[facility]  [TWNoteComboBox1] : 90% [de-identified] : 08/24/2023: follow up today for left L4/5, L5/S1 TFESI on 8/10.  Had 70% -80% relief. Pain over the right lower back. No neck pain.     06/15/2023: follow up today after LESI L4-5 on 5/25.  Had 80% relief for about 2 weeks.  Will switch to Left L4/5, L5/S1 TFESI.    05/09/2023 : Patient presents for initial evaluation. She c/o back pain and left leg pain for the last couple of months. Pain radiates to the ankle. +n/t in the feet. no weakness. Takes Advil PRN.  Currently in PT with some benefit. Also treating her neck pain.   Subjective Weakness: No Numbness/Tingling: Yes Bladder/Bowel dysfunction: No Physical Therapy: January to June.    Attempted modalities for current pain complaint: See above: Medications: No  Injections: No  LESI L4/5 5/25/23 Left L4/5, L5/S1 TFESI 8/11/23 Previous Spine Surgery: N/A  Imaging: MRI Lumbar Spine (4/26/23) Impression:  1. Convex left curvature, diffuse loss of disc signal and height, Schmorl's nodes and Modic type 1 endplate  change at anterior L1-L2. No fracture. 2. L5-S1: Bulge, facet hypertrophy, and left facet effusion with inferior left foraminal stenosis. 3. L4-L5: Minor retrolisthesis. Bulge, spondylotic ridge, facet arthrosis, ligamentum flavum hypertrophy, and  facet effusion on the right. Foraminal stenosis on the left. Mild central stenosis. 4. L3-L4: Minor retrolisthesis. Broad bulge, facet arthrosis, ligamentum flavum hypertrophy with foraminal  stenosis. Mild central stenosis.  5. L2-L3: Minor retrolisthesis. Broad bulge, spondylotic ridge, facet arthrosis right greater than left. Mild  central stenosis. 6. L1-L2: Minor retrolisthesis. Broad bulge, facet arthrosis, ligamentum flavum hypertrophy with facet effusion  right greater than left. Inferior right foraminal stenosis. Mild central stenosis. 7. T12-L1: Broad bulge and facet arthrosis. 8. Bilateral renal cysts. Dilated ureters. Ultrasound is suggested for further evaluation.

## 2023-08-24 NOTE — ASSESSMENT
[FreeTextEntry1] : After discussing various treatment options with the patient including but not limited to oral medications, physical therapy, exercise, modalities as well as interventional spinal injections, we have decided with the following plan:  1) Intervention Injection Therapy: I personally reviewed the MRI/CT scan images and agree with the radiologist's report. The radiological findings were discussed with the patient. The risks, benefits, contents and alternatives to injection were explained in full to the patient. Risks outlined include but are not limited to infection,sepsis, bleeding, post-dural puncture headache, nerve damage, temporary increase in pain, syncopal episode, failure to resolve symptoms, allergic reaction, symptom recurrence, and elevation of blood sugar in diabetics. Cortisone may cause immunosuppression. Patient understands the risks. All questions were answered. After discussion of options, patient requested an injection. Information regarding the injection was given to the patient. Which medications to stop prior to the injection was explained to the patient as well.  Follow up in 1-2 weeks post injection for re-evaluation.  Continue Home exercises, stretching, activity modification, physical therapy, and conservative care.  Patient is presenting with acute/sub-acute radicular pain with impairment in ADLs and functionality.  The pain has not responded sufficiently to  conservative care including nsaid therapy and/or physical therapy.  There is no bleeding tendency, unstable medical condition, or systemic infection. The purpose of the spinal injections is to facilitate active therapy by providing short term relief through reduction of pain and inflammation.   Injections, by themselves, are not likely to provide long-term relief. Rather, active rehabilitation with modified work achieves long-term relief by increasing active ROM, strength and stability.   Patient presents with axial lumbar pain that has not responded to  3 months of conservative therapy including physical therapy or NSAID therapy.  The pain is interfering with activities of daily living and functionality.   There is no radicular pain.   The pain is exacerbated by facet loading.  Positive Kemps maneuver which is defined by pain reproduction with extension and rotation of the lumbar spine to the affected side.  The patient has not had a vertebral fusion at the levels of the proposed treatment.  There is no unexplained neurologic deficit.  There is no history of systemic infection, unstable medical condition, bleeding tendency, or local infection.  The injection is being performed to diagnose the facet joint as the source of the individual's pain.  right lumbar MBB will call (axial lower back pain)  2) I would recommend acupuncture as an adjuvant therapy for JOSE . He has completed other conservative therapies including med management and physical therapy. Goals would be to improve pain, spasm, ROM and overall function.   3) consider aqua therapy

## 2024-01-05 ENCOUNTER — APPOINTMENT (OUTPATIENT)
Dept: ORTHOPEDIC SURGERY | Facility: CLINIC | Age: 84
End: 2024-01-05
Payer: MEDICARE

## 2024-01-05 DIAGNOSIS — M54.12 RADICULOPATHY, CERVICAL REGION: ICD-10-CM

## 2024-01-05 PROCEDURE — 99213 OFFICE O/P EST LOW 20 MIN: CPT

## 2024-01-05 RX ORDER — METHYLPREDNISOLONE 4 MG/1
4 TABLET ORAL
Qty: 1 | Refills: 0 | Status: ACTIVE | COMMUNITY
Start: 2024-01-05 | End: 1900-01-01

## 2024-01-05 NOTE — HISTORY OF PRESENT ILLNESS
[0] : 0 [Sleep] : sleep [4] : 4 [Orthovisc] : Orthovisc [de-identified] : 4/25/23- Having worsening left sciatica symptoms from buttock down leg 3/30/23- Still getting right arm symptoms at night, but pain/numbness subsides during the day. PT of help 1/26/23- Left knee is feeling better, having low back pain issues, and pain down right arm to hand tre at night. No neck or back injury7- 5-22- for continued orthovisc to the left knee #3  6-2-22- Known left knee oa did well with orthovisc and therapy in november. Over the last 2 weeks medially based pain has returned and she [place] a trip [place] [] : no [FreeTextEntry1] : left knee  [FreeTextEntry5] : She states that she felt great after her gel injs but she had a flare up on 5/27/22 and feels that the pain has been getting worse since. She couldn't walk and had to use a wheelchair because the pain was so bad. [de-identified] : 7/5/22

## 2024-01-05 NOTE — REASON FOR VISIT
[FreeTextEntry2] : 01/05/2024 Getting pain and numbness from shoulders down to the hands when she lays down at night. Not getting much numbness during the day

## 2024-01-11 ENCOUNTER — APPOINTMENT (OUTPATIENT)
Dept: NEUROLOGY | Facility: CLINIC | Age: 84
End: 2024-01-11

## 2024-01-16 ENCOUNTER — APPOINTMENT (OUTPATIENT)
Dept: ORTHOPEDIC SURGERY | Facility: CLINIC | Age: 84
End: 2024-01-16

## 2024-01-23 ENCOUNTER — APPOINTMENT (OUTPATIENT)
Dept: NEUROLOGY | Facility: CLINIC | Age: 84
End: 2024-01-23
Payer: MEDICARE

## 2024-01-23 PROCEDURE — 95913 NRV CNDJ TEST 13/> STUDIES: CPT

## 2024-01-23 PROCEDURE — 95886 MUSC TEST DONE W/N TEST COMP: CPT

## 2024-01-25 ENCOUNTER — APPOINTMENT (OUTPATIENT)
Dept: ORTHOPEDIC SURGERY | Facility: CLINIC | Age: 84
End: 2024-01-25
Payer: MEDICARE

## 2024-01-25 VITALS — WEIGHT: 175 LBS | BODY MASS INDEX: 32.2 KG/M2 | HEIGHT: 62 IN

## 2024-01-25 DIAGNOSIS — G56.00 CARPAL TUNNEL SYNDROME, UNSPECIFIED UPPER LIMB: ICD-10-CM

## 2024-01-25 PROCEDURE — 99213 OFFICE O/P EST LOW 20 MIN: CPT

## 2024-01-26 ENCOUNTER — APPOINTMENT (OUTPATIENT)
Dept: ORTHOPEDIC SURGERY | Facility: CLINIC | Age: 84
End: 2024-01-26

## 2024-02-02 ENCOUNTER — APPOINTMENT (OUTPATIENT)
Dept: ORTHOPEDIC SURGERY | Facility: CLINIC | Age: 84
End: 2024-02-02
Payer: MEDICARE

## 2024-02-02 PROCEDURE — L3908: CPT | Mod: RT

## 2024-02-02 PROCEDURE — 99203 OFFICE O/P NEW LOW 30 MIN: CPT

## 2024-02-02 NOTE — HISTORY OF PRESENT ILLNESS
[0] : 0 [4] : 4 [Orthovisc] : Orthovisc [de-identified] : 83 year old female presenting with b/l hand pain, numbness and tingling. She had intermittent symptoms for years, but worsened about 2 months ago. Symptoms are worse at night. Symptoms are not constant. She has not yet night time splinted. She was seen by Dr. Cortez who ordered an EMG.   EMGs: Severe right, moderate left CTS. [] : no [FreeTextEntry1] : b/l hand [FreeTextEntry5] : b/l hand pain that was evaluated by dr. valladares [de-identified] : EMG [de-identified] : 7/5/22

## 2024-02-02 NOTE — DISCUSSION/SUMMARY
[de-identified] : Discussed the nature of the diagnosis and risk and benefits of different modalities of treatment. EMG reviewed and discussed.  Will try a period of night time splinting. She will pay attention to if she has symptoms in the little finger.  RTO 2 weeks.

## 2024-02-08 ENCOUNTER — APPOINTMENT (OUTPATIENT)
Dept: PAIN MANAGEMENT | Facility: CLINIC | Age: 84
End: 2024-02-08
Payer: MEDICARE

## 2024-02-08 VITALS — BODY MASS INDEX: 30.36 KG/M2 | HEIGHT: 62 IN | WEIGHT: 165 LBS

## 2024-02-08 DIAGNOSIS — M54.16 RADICULOPATHY, LUMBAR REGION: ICD-10-CM

## 2024-02-08 PROCEDURE — 99214 OFFICE O/P EST MOD 30 MIN: CPT

## 2024-02-08 NOTE — PHYSICAL EXAM
[NL (90)] : forward flexion 90 degrees [Extension] : extension [] : no lumbar paraspinal tenderness [de-identified] : extension 10 degrees

## 2024-02-08 NOTE — HISTORY OF PRESENT ILLNESS
[Neck] : neck [Lower back] : lower back [7] : 7 [6] : 6 [Dull/Aching] : dull/aching [Intermittent] : intermittent [Household chores] : household chores [Meds] : meds [Physical therapy] : physical therapy [Injection therapy] : injection therapy [Sitting] : sitting [Lying in bed] : lying in bed [Sleep] : sleep [] : no [FreeTextEntry1] : Right side  [FreeTextEntry9] : XANAX  [de-identified] : getting up from sitting  [de-identified] : MRI L SPINE 04/23 [facility]  [TWNoteComboBox1] : 90% [de-identified] : 02/08/2024: follow up today.  Pain is returning in outer thigh of left leg.  Recently diagnosed with severe CTS.  08/24/2023: follow up today for left L4/5, L5/S1 TFESI on 8/10.  Had 70% -80% relief. Pain over the right lower back. No neck pain.     06/15/2023: follow up today after LESI L4-5 on 5/25.  Had 80% relief for about 2 weeks.  Will switch to Left L4/5, L5/S1 TFESI.    05/09/2023 : Patient presents for initial evaluation. She c/o back pain and left leg pain for the last couple of months. Pain radiates to the ankle. +n/t in the feet. no weakness. Takes Advil PRN.  Currently in PT with some benefit. Also treating her neck pain.   Subjective Weakness: No Numbness/Tingling: Yes Bladder/Bowel dysfunction: No Physical Therapy: January to June.    Attempted modalities for current pain complaint: See above: Medications: No  Injections: No  LESI L4/5 5/25/23 Left L4/5, L5/S1 TFESI 8/11/23 Previous Spine Surgery: N/A  Imaging: MRI Lumbar Spine (4/26/23) Impression:  1. Convex left curvature, diffuse loss of disc signal and height, Schmorl's nodes and Modic type 1 endplate  change at anterior L1-L2. No fracture. 2. L5-S1: Bulge, facet hypertrophy, and left facet effusion with inferior left foraminal stenosis. 3. L4-L5: Minor retrolisthesis. Bulge, spondylotic ridge, facet arthrosis, ligamentum flavum hypertrophy, and  facet effusion on the right. Foraminal stenosis on the left. Mild central stenosis. 4. L3-L4: Minor retrolisthesis. Broad bulge, facet arthrosis, ligamentum flavum hypertrophy with foraminal  stenosis. Mild central stenosis.  5. L2-L3: Minor retrolisthesis. Broad bulge, spondylotic ridge, facet arthrosis right greater than left. Mild  central stenosis. 6. L1-L2: Minor retrolisthesis. Broad bulge, facet arthrosis, ligamentum flavum hypertrophy with facet effusion  right greater than left. Inferior right foraminal stenosis. Mild central stenosis. 7. T12-L1: Broad bulge and facet arthrosis. 8. Bilateral renal cysts. Dilated ureters. Ultrasound is suggested for further evaluation.

## 2024-02-08 NOTE — ASSESSMENT
[FreeTextEntry1] : After discussing various treatment options with the patient including but not limited to oral medications, physical therapy, exercise, modalities as well as interventional spinal injections, we have decided with the following plan:  1) Intervention Injection Therapy: I personally reviewed the MRI/CT scan images and agree with the radiologist's report. The radiological findings were discussed with the patient. The risks, benefits, contents and alternatives to injection were explained in full to the patient. Risks outlined include but are not limited to infection,sepsis, bleeding, post-dural puncture headache, nerve damage, temporary increase in pain, syncopal episode, failure to resolve symptoms, allergic reaction, symptom recurrence, and elevation of blood sugar in diabetics. Cortisone may cause immunosuppression. Patient understands the risks. All questions were answered. After discussion of options, patient requested an injection. Information regarding the injection was given to the patient. Which medications to stop prior to the injection was explained to the patient as well.  Follow up in 1-2 weeks post injection for re-evaluation.  Continue Home exercises, stretching, activity modification, physical therapy, and conservative care.  Patient is presenting with acute/sub-acute radicular pain with impairment in ADLs and functionality.  The pain has not responded sufficiently to  conservative care including nsaid therapy and/or physical therapy.  There is no bleeding tendency, unstable medical condition, or systemic infection. The purpose of the spinal injections is to facilitate active therapy by providing short term relief through reduction of pain and inflammation.   Injections, by themselves, are not likely to provide long-term relief. Rather, active rehabilitation with modified work achieves long-term relief by increasing active ROM, strength and stability.   Patient presents with axial lumbar pain that has not responded to  3 months of conservative therapy including physical therapy or NSAID therapy.  The pain is interfering with activities of daily living and functionality.   There is no radicular pain.   The pain is exacerbated by facet loading.  Positive Kemps maneuver which is defined by pain reproduction with extension and rotation of the lumbar spine to the affected side.  The patient has not had a vertebral fusion at the levels of the proposed treatment.  There is no unexplained neurologic deficit.  There is no history of systemic infection, unstable medical condition, bleeding tendency, or local infection.  The injection is being performed to diagnose the facet joint as the source of the individual's pain.  left L4/5, L5/S1 TFESI  2) I would recommend acupuncture as an adjuvant therapy for JOSE . He has completed other conservative therapies including med management and physical therapy. Goals would be to improve pain, spasm, ROM and overall function.   3) consider aqua therapy

## 2024-02-19 ENCOUNTER — APPOINTMENT (OUTPATIENT)
Dept: ORTHOPEDIC SURGERY | Facility: CLINIC | Age: 84
End: 2024-02-19
Payer: MEDICARE

## 2024-02-19 VITALS — BODY MASS INDEX: 30.36 KG/M2 | WEIGHT: 165 LBS | HEIGHT: 62 IN

## 2024-02-19 PROCEDURE — 99214 OFFICE O/P EST MOD 30 MIN: CPT

## 2024-02-19 NOTE — DISCUSSION/SUMMARY
[de-identified] : Discussed the nature of the diagnosis and risk and benefits of different modalities of treatment. She no longer wants to splint at night and wants to proceed surgically.  Will    The risks of the procedure, including but not limited to infection, bleeding, anesthetic complication, neurovascular injury and the possibility of subsequent surgery were discussed; the benefits, non-operative alternatives and expectations of the proposed procedure were also discussed. Post-operative management, the procedure itself and the expected recovery period were discussed at length with the patient. The need for post-operative physical therapy and home exercise regimen, possible bracing and medication management were also discussed.

## 2024-02-19 NOTE — HISTORY OF PRESENT ILLNESS
[1] : 2 [0] : 0 [de-identified] : 84 year old female followed for b/l CTS. She has been night time splinting. Little and ring fingers not involved in symptoms. She believes that night time splinting has improved her symptoms. but she states that she is unable to sleep because she finds if very difficult to tolerate the splints on both hands. Numbness and tingling is intermittent.   EMGs: Severe right, moderate left CTS.  [] : no [FreeTextEntry1] : b/l hand [FreeTextEntry5] : b/l hand pain that was evaluated by dr. valladares [de-identified] : EMG

## 2024-02-20 ENCOUNTER — APPOINTMENT (OUTPATIENT)
Dept: PAIN MANAGEMENT | Facility: CLINIC | Age: 84
End: 2024-02-20

## 2024-02-20 ENCOUNTER — APPOINTMENT (OUTPATIENT)
Dept: ORTHOPEDIC SURGERY | Facility: CLINIC | Age: 84
End: 2024-02-20

## 2024-03-07 ENCOUNTER — APPOINTMENT (OUTPATIENT)
Dept: PAIN MANAGEMENT | Facility: CLINIC | Age: 84
End: 2024-03-07

## 2024-03-12 ENCOUNTER — APPOINTMENT (OUTPATIENT)
Dept: ORTHOPEDIC SURGERY | Facility: CLINIC | Age: 84
End: 2024-03-12
Payer: MEDICARE

## 2024-03-12 PROCEDURE — 99213 OFFICE O/P EST LOW 20 MIN: CPT | Mod: 25

## 2024-03-12 PROCEDURE — 20526 THER INJECTION CARP TUNNEL: CPT | Mod: 50

## 2024-03-12 NOTE — HISTORY OF PRESENT ILLNESS
[1] : 2 [0] : 0 [de-identified] : 84 year old female followed for b/l CTS. She was ready to proceed surgically with CTR but is unable to at this time due to scheduling. She is interested in carpal tunnel injections.  [] : no [FreeTextEntry1] : b/l hand [FreeTextEntry5] : pain imporved since last office visit [de-identified] : EMG

## 2024-03-12 NOTE — DISCUSSION/SUMMARY
[de-identified] : Discussed the nature of the diagnosis and risk and benefits of different modalities of treatment. b/l CTS. Discussed that she would have to wait for 3 months after the injections to proceed surgically.  She understood. B/l Carpal tunnel injections done today and tolerated well.

## 2024-03-12 NOTE — PROCEDURE
[Carpal Tunnel] : carpal tunnel [Bilateral] : bilaterally of the [___ cc    10mg] : Triamcinolone (Kenalog) ~Vcc of 10 mg  [] : Patient tolerated procedure well [Risks, benefits, alternatives discussed / Verbal consent obtained] : the risks benefits, and alternatives have been discussed, and verbal consent was obtained

## 2024-04-23 ENCOUNTER — APPOINTMENT (OUTPATIENT)
Dept: ORTHOPEDIC SURGERY | Facility: CLINIC | Age: 84
End: 2024-04-23
Payer: MEDICARE

## 2024-04-23 VITALS — BODY MASS INDEX: 30.36 KG/M2 | WEIGHT: 165 LBS | HEIGHT: 62 IN

## 2024-04-23 PROCEDURE — 99213 OFFICE O/P EST LOW 20 MIN: CPT

## 2024-04-23 NOTE — DISCUSSION/SUMMARY
[de-identified] : Discussed the nature of the diagnosis and risk and benefits of different modalities of treatment. As she had good relief form the injections, it is expected to have similar relief of symptoms after CTR.  Discussed surgical intervention for CTS.  She is interested in surgical intervention in the fall. RTO 8 weeks.

## 2024-04-23 NOTE — HISTORY OF PRESENT ILLNESS
[1] : 2 [0] : 0 [de-identified] : 84 year old female followed for b/l CTS. She was given b/l Carpal tunnel injections 5 weeks ago. She reports she had good releif for about 3 weeks, symptoms starting to return.   8EMG: (1/2024) severe right, moderate left CTS, b/l mild C5 C6>C7  [] : no [FreeTextEntry1] : b/l hand [FreeTextEntry5] : pain improved since last office visit [de-identified] : EMG

## 2024-06-25 ENCOUNTER — APPOINTMENT (OUTPATIENT)
Dept: ORTHOPEDIC SURGERY | Facility: CLINIC | Age: 84
End: 2024-06-25
Payer: MEDICARE

## 2024-06-25 DIAGNOSIS — G56.01 CARPAL TUNNEL SYNDROME, RIGHT UPPER LIMB: ICD-10-CM

## 2024-06-25 DIAGNOSIS — G56.02 CARPAL TUNNEL SYNDROME, LEFT UPPER LIMB: ICD-10-CM

## 2024-06-25 PROCEDURE — 99214 OFFICE O/P EST MOD 30 MIN: CPT

## 2024-09-11 ENCOUNTER — APPOINTMENT (OUTPATIENT)
Age: 84
End: 2024-09-11
Payer: MEDICARE

## 2024-09-11 PROCEDURE — 64721 CARPAL TUNNEL SURGERY: CPT | Mod: RT

## 2024-09-24 ENCOUNTER — APPOINTMENT (OUTPATIENT)
Dept: ORTHOPEDIC SURGERY | Facility: CLINIC | Age: 84
End: 2024-09-24
Payer: MEDICARE

## 2024-09-24 VITALS — BODY MASS INDEX: 30.36 KG/M2 | WEIGHT: 165 LBS | HEIGHT: 62 IN

## 2024-09-24 PROCEDURE — 99024 POSTOP FOLLOW-UP VISIT: CPT

## 2024-09-24 NOTE — DISCUSSION/SUMMARY
[de-identified] : Discussed the nature of the diagnosis and risk and benefits of different modalities of treatment. Sutures removed She will refrain from heavy lifting for 10 days RTO 4 weeks

## 2024-09-24 NOTE — HISTORY OF PRESENT ILLNESS
[Gradual] : gradual [3] : 3 [1] : 2 [Dull/Aching] : dull/aching [Frequent] : frequent [Rest] : rest [Tingling] : tingling [de-identified] : 84 year old female presents 12 days s/p RT CTR. She is scheduled with LT CTR Oct-02.  DOS: 9/11/24 [] : no [FreeTextEntry1] : right wrist  [de-identified] : certain movements  [de-identified] : 9/11/24

## 2024-10-02 ENCOUNTER — APPOINTMENT (OUTPATIENT)
Age: 84
End: 2024-10-02

## 2024-10-02 PROCEDURE — 64721 CARPAL TUNNEL SURGERY: CPT | Mod: 79,LT

## 2024-10-04 ENCOUNTER — APPOINTMENT (OUTPATIENT)
Dept: ORTHOPEDIC SURGERY | Facility: CLINIC | Age: 84
End: 2024-10-04
Payer: MEDICARE

## 2024-10-04 DIAGNOSIS — G56.01 CARPAL TUNNEL SYNDROME, RIGHT UPPER LIMB: ICD-10-CM

## 2024-10-04 DIAGNOSIS — G56.02 CARPAL TUNNEL SYNDROME, LEFT UPPER LIMB: ICD-10-CM

## 2024-10-04 PROCEDURE — 99024 POSTOP FOLLOW-UP VISIT: CPT

## 2024-10-04 NOTE — HISTORY OF PRESENT ILLNESS
[de-identified] : 84 year old female presents 4 weeks s/p RT CTR. She is scheduled with LT CTR Oct-02. Dressing clean and dry.     DOS: 9/11/24, 10/2/24

## 2024-10-15 ENCOUNTER — APPOINTMENT (OUTPATIENT)
Dept: ORTHOPEDIC SURGERY | Facility: CLINIC | Age: 84
End: 2024-10-15
Payer: MEDICARE

## 2024-10-15 VITALS — WEIGHT: 165 LBS | HEIGHT: 62 IN | BODY MASS INDEX: 30.36 KG/M2

## 2024-10-15 DIAGNOSIS — G56.02 CARPAL TUNNEL SYNDROME, LEFT UPPER LIMB: ICD-10-CM

## 2024-10-15 PROCEDURE — 99024 POSTOP FOLLOW-UP VISIT: CPT

## 2024-11-12 ENCOUNTER — APPOINTMENT (OUTPATIENT)
Dept: ORTHOPEDIC SURGERY | Facility: CLINIC | Age: 84
End: 2024-11-12
Payer: MEDICARE

## 2024-11-12 VITALS — BODY MASS INDEX: 30.36 KG/M2 | WEIGHT: 165 LBS | HEIGHT: 62 IN

## 2024-11-12 DIAGNOSIS — G56.01 CARPAL TUNNEL SYNDROME, RIGHT UPPER LIMB: ICD-10-CM

## 2024-11-12 DIAGNOSIS — G56.02 CARPAL TUNNEL SYNDROME, LEFT UPPER LIMB: ICD-10-CM

## 2024-11-12 PROCEDURE — 99024 POSTOP FOLLOW-UP VISIT: CPT

## 2025-01-07 ENCOUNTER — APPOINTMENT (OUTPATIENT)
Dept: ORTHOPEDIC SURGERY | Facility: CLINIC | Age: 85
End: 2025-01-07
Payer: MEDICARE

## 2025-01-07 VITALS — WEIGHT: 165 LBS | BODY MASS INDEX: 30.36 KG/M2 | HEIGHT: 62 IN

## 2025-01-07 DIAGNOSIS — G56.02 CARPAL TUNNEL SYNDROME, LEFT UPPER LIMB: ICD-10-CM

## 2025-01-07 DIAGNOSIS — M65.351 TRIGGER FINGER, RIGHT LITTLE FINGER: ICD-10-CM

## 2025-01-07 DIAGNOSIS — G56.01 CARPAL TUNNEL SYNDROME, RIGHT UPPER LIMB: ICD-10-CM

## 2025-01-07 PROCEDURE — 99213 OFFICE O/P EST LOW 20 MIN: CPT

## 2025-01-28 ENCOUNTER — APPOINTMENT (OUTPATIENT)
Dept: ORTHOPEDIC SURGERY | Facility: CLINIC | Age: 85
End: 2025-01-28
Payer: MEDICARE

## 2025-01-28 DIAGNOSIS — M65.351 TRIGGER FINGER, RIGHT LITTLE FINGER: ICD-10-CM

## 2025-01-28 PROCEDURE — 20550 NJX 1 TENDON SHEATH/LIGAMENT: CPT | Mod: RT
